# Patient Record
Sex: MALE | Race: OTHER | HISPANIC OR LATINO | URBAN - METROPOLITAN AREA
[De-identification: names, ages, dates, MRNs, and addresses within clinical notes are randomized per-mention and may not be internally consistent; named-entity substitution may affect disease eponyms.]

---
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
## 2019-08-14 ENCOUNTER — EMERGENCY (EMERGENCY)
Facility: HOSPITAL | Age: 19
LOS: 1 days | Discharge: HOME | End: 2019-08-14
Attending: EMERGENCY MEDICINE
Payer: MEDICAID

## 2019-08-14 VITALS
WEIGHT: 160.06 LBS | DIASTOLIC BLOOD PRESSURE: 77 MMHG | TEMPERATURE: 98 F | HEART RATE: 112 BPM | RESPIRATION RATE: 19 BRPM | OXYGEN SATURATION: 97 % | HEIGHT: 68 IN | SYSTOLIC BLOOD PRESSURE: 110 MMHG

## 2019-08-14 LAB
ALBUMIN SERPL ELPH-MCNC: 4.6 G/DL — SIGNIFICANT CHANGE UP (ref 3.5–5)
ALP SERPL-CCNC: 118 U/L — SIGNIFICANT CHANGE UP (ref 40–120)
ALT FLD-CCNC: 27 U/L DA — SIGNIFICANT CHANGE UP (ref 10–60)
ANION GAP SERPL CALC-SCNC: 6 MMOL/L — SIGNIFICANT CHANGE UP (ref 5–17)
APPEARANCE UR: CLEAR — SIGNIFICANT CHANGE UP
AST SERPL-CCNC: 22 U/L — SIGNIFICANT CHANGE UP (ref 10–40)
BASOPHILS # BLD AUTO: 0.04 K/UL — SIGNIFICANT CHANGE UP (ref 0–0.2)
BASOPHILS NFR BLD AUTO: 0.4 % — SIGNIFICANT CHANGE UP (ref 0–2)
BILIRUB SERPL-MCNC: 1.4 MG/DL — HIGH (ref 0.2–1.2)
BILIRUB UR-MCNC: ABNORMAL
BUN SERPL-MCNC: 9 MG/DL — SIGNIFICANT CHANGE UP (ref 7–18)
CALCIUM SERPL-MCNC: 9.5 MG/DL — SIGNIFICANT CHANGE UP (ref 8.4–10.5)
CHLORIDE SERPL-SCNC: 107 MMOL/L — SIGNIFICANT CHANGE UP (ref 96–108)
CO2 SERPL-SCNC: 27 MMOL/L — SIGNIFICANT CHANGE UP (ref 22–31)
COLOR SPEC: YELLOW — SIGNIFICANT CHANGE UP
CREAT SERPL-MCNC: 1.02 MG/DL — SIGNIFICANT CHANGE UP (ref 0.5–1.3)
DIFF PNL FLD: ABNORMAL
EOSINOPHIL # BLD AUTO: 0.01 K/UL — SIGNIFICANT CHANGE UP (ref 0–0.5)
EOSINOPHIL NFR BLD AUTO: 0.1 % — SIGNIFICANT CHANGE UP (ref 0–6)
GLUCOSE SERPL-MCNC: 100 MG/DL — HIGH (ref 70–99)
GLUCOSE UR QL: NEGATIVE — SIGNIFICANT CHANGE UP
HCT VFR BLD CALC: 48.7 % — SIGNIFICANT CHANGE UP (ref 39–50)
HGB BLD-MCNC: 16.5 G/DL — SIGNIFICANT CHANGE UP (ref 13–17)
IMM GRANULOCYTES NFR BLD AUTO: 0.2 % — SIGNIFICANT CHANGE UP (ref 0–1.5)
KETONES UR-MCNC: ABNORMAL
LEUKOCYTE ESTERASE UR-ACNC: NEGATIVE — SIGNIFICANT CHANGE UP
LIDOCAIN IGE QN: 72 U/L — LOW (ref 73–393)
LYMPHOCYTES # BLD AUTO: 1.25 K/UL — SIGNIFICANT CHANGE UP (ref 1–3.3)
LYMPHOCYTES # BLD AUTO: 12.2 % — LOW (ref 13–44)
MCHC RBC-ENTMCNC: 30.7 PG — SIGNIFICANT CHANGE UP (ref 27–34)
MCHC RBC-ENTMCNC: 33.9 GM/DL — SIGNIFICANT CHANGE UP (ref 32–36)
MCV RBC AUTO: 90.5 FL — SIGNIFICANT CHANGE UP (ref 80–100)
MONOCYTES # BLD AUTO: 0.84 K/UL — SIGNIFICANT CHANGE UP (ref 0–0.9)
MONOCYTES NFR BLD AUTO: 8.2 % — SIGNIFICANT CHANGE UP (ref 2–14)
NEUTROPHILS # BLD AUTO: 8.05 K/UL — HIGH (ref 1.8–7.4)
NEUTROPHILS NFR BLD AUTO: 78.9 % — HIGH (ref 43–77)
NITRITE UR-MCNC: NEGATIVE — SIGNIFICANT CHANGE UP
NRBC # BLD: 0 /100 WBCS — SIGNIFICANT CHANGE UP (ref 0–0)
PH UR: 6 — SIGNIFICANT CHANGE UP (ref 5–8)
PLATELET # BLD AUTO: 281 K/UL — SIGNIFICANT CHANGE UP (ref 150–400)
POTASSIUM SERPL-MCNC: 3.7 MMOL/L — SIGNIFICANT CHANGE UP (ref 3.5–5.3)
POTASSIUM SERPL-SCNC: 3.7 MMOL/L — SIGNIFICANT CHANGE UP (ref 3.5–5.3)
PROT SERPL-MCNC: 8.6 G/DL — HIGH (ref 6–8.3)
PROT UR-MCNC: 30 MG/DL
RBC # BLD: 5.38 M/UL — SIGNIFICANT CHANGE UP (ref 4.2–5.8)
RBC # FLD: 12.4 % — SIGNIFICANT CHANGE UP (ref 10.3–14.5)
SODIUM SERPL-SCNC: 140 MMOL/L — SIGNIFICANT CHANGE UP (ref 135–145)
SP GR SPEC: 1.02 — SIGNIFICANT CHANGE UP (ref 1.01–1.02)
UROBILINOGEN FLD QL: 4
WBC # BLD: 10.21 K/UL — SIGNIFICANT CHANGE UP (ref 3.8–10.5)
WBC # FLD AUTO: 10.21 K/UL — SIGNIFICANT CHANGE UP (ref 3.8–10.5)

## 2019-08-14 PROCEDURE — 96374 THER/PROPH/DIAG INJ IV PUSH: CPT | Mod: XU

## 2019-08-14 PROCEDURE — 81001 URINALYSIS AUTO W/SCOPE: CPT

## 2019-08-14 PROCEDURE — 85027 COMPLETE CBC AUTOMATED: CPT

## 2019-08-14 PROCEDURE — 74177 CT ABD & PELVIS W/CONTRAST: CPT | Mod: 26

## 2019-08-14 PROCEDURE — 93005 ELECTROCARDIOGRAM TRACING: CPT

## 2019-08-14 PROCEDURE — 99284 EMERGENCY DEPT VISIT MOD MDM: CPT

## 2019-08-14 PROCEDURE — 74177 CT ABD & PELVIS W/CONTRAST: CPT

## 2019-08-14 PROCEDURE — 36415 COLL VENOUS BLD VENIPUNCTURE: CPT

## 2019-08-14 PROCEDURE — 80053 COMPREHEN METABOLIC PANEL: CPT

## 2019-08-14 PROCEDURE — 83690 ASSAY OF LIPASE: CPT

## 2019-08-14 PROCEDURE — 99284 EMERGENCY DEPT VISIT MOD MDM: CPT | Mod: 25

## 2019-08-14 RX ORDER — ALBUTEROL 90 UG/1
0 AEROSOL, METERED ORAL
Qty: 0 | Refills: 0 | DISCHARGE

## 2019-08-14 RX ORDER — SODIUM CHLORIDE 9 MG/ML
1000 INJECTION INTRAMUSCULAR; INTRAVENOUS; SUBCUTANEOUS ONCE
Refills: 0 | Status: COMPLETED | OUTPATIENT
Start: 2019-08-14 | End: 2019-08-14

## 2019-08-14 RX ORDER — ONDANSETRON 8 MG/1
4 TABLET, FILM COATED ORAL ONCE
Refills: 0 | Status: COMPLETED | OUTPATIENT
Start: 2019-08-14 | End: 2019-08-14

## 2019-08-14 RX ADMIN — ONDANSETRON 4 MILLIGRAM(S): 8 TABLET, FILM COATED ORAL at 22:26

## 2019-08-14 RX ADMIN — SODIUM CHLORIDE 1000 MILLILITER(S): 9 INJECTION INTRAMUSCULAR; INTRAVENOUS; SUBCUTANEOUS at 22:26

## 2019-08-14 NOTE — ED PROVIDER NOTE - CARE PROVIDER_API CALL
Ulises Recio (DO)  Cardiology Medicine  [address] [place] [address], [address] [place] [address]  Phone: [phone]  Fax: [phone]  Follow Up Time:

## 2019-08-14 NOTE — ED PROVIDER NOTE - NSFOLLOWUPINSTRUCTIONS_ED_ALL_ED_FT
Return to ER immediately if your abdominal pain does not improve, worsens, or persists, if you have fever, vomiting,  blood in stools or you have black stools, unable to eat or drink, have worsening/persistent diarrhea or constipation, any concerns. See your doctor as soon as possible (within 1-2 days).   If you need further assistance for appointments you can contact the [place] Care Coordinator at [phone]. In addition our outpatient [facility] is located at [address] [place] [address], tele: [phone].

## 2019-08-14 NOTE — ED PROVIDER NOTE - OBJECTIVE STATEMENT
Chief complaint of nausea, vomiting, lower abdominal pain since 2pm. Denies fever. Pt also states felt weak, dnies LOC.  Denies recent outside [place] travels, sick contacts or known spoiled food consumption.

## 2019-08-14 NOTE — ED PROVIDER NOTE - CLINICAL SUMMARY MEDICAL DECISION MAKING FREE TEXT BOX
12:52a- Pt feels better, CT a/p reported normal appendix no infectious/inflammatory process. pt eating and drinking, no vomiting. Pt is well appearing walking with normal gait, stable for discharge and follow up with medical doctor. Pt educated on care and need for follow up. Discussed anticipatory guidance and return precautions. Questions answered. I had a detailed discussion with the patient and/or guardian regarding the historical points, exam findings, and any diagnostic results supporting the discharge diagnosis.  HR now 88 bpm.

## 2019-08-15 RX ORDER — ONDANSETRON 8 MG/1
1 TABLET, FILM COATED ORAL
Qty: 12 | Refills: 0
Start: 2019-08-15

## 2021-06-28 ENCOUNTER — TRANSCRIPTION ENCOUNTER (OUTPATIENT)
Age: 21
End: 2021-06-28

## 2021-12-02 ENCOUNTER — EMERGENCY (EMERGENCY)
Facility: HOSPITAL | Age: 21
LOS: 0 days | Discharge: HOME | End: 2021-12-03
Attending: EMERGENCY MEDICINE
Payer: MEDICAID

## 2021-12-02 VITALS
SYSTOLIC BLOOD PRESSURE: 114 MMHG | DIASTOLIC BLOOD PRESSURE: 76 MMHG | HEART RATE: 88 BPM | RESPIRATION RATE: 18 BRPM | OXYGEN SATURATION: 96 % | TEMPERATURE: 98 F

## 2021-12-02 VITALS — HEIGHT: 68 IN | WEIGHT: 210.1 LBS

## 2021-12-02 DIAGNOSIS — L98.9 DISORDER OF THE SKIN AND SUBCUTANEOUS TISSUE, UNSPECIFIED: ICD-10-CM

## 2021-12-02 DIAGNOSIS — L02.212 CUTANEOUS ABSCESS OF BACK [ANY PART, EXCEPT BUTTOCK]: ICD-10-CM

## 2021-12-02 DIAGNOSIS — Z91.013 ALLERGY TO SEAFOOD: ICD-10-CM

## 2021-12-02 PROCEDURE — 99283 EMERGENCY DEPT VISIT LOW MDM: CPT | Mod: 25

## 2021-12-02 PROCEDURE — 99282 EMERGENCY DEPT VISIT SF MDM: CPT | Mod: 25

## 2021-12-02 PROCEDURE — 10060 I&D ABSCESS SIMPLE/SINGLE: CPT

## 2021-12-02 PROCEDURE — 10061 I&D ABSCESS COMP/MULTIPLE: CPT

## 2021-12-02 RX ORDER — AZTREONAM 2 G
1 VIAL (EA) INJECTION
Qty: 14 | Refills: 0
Start: 2021-12-02 | End: 2021-12-08

## 2021-12-02 RX ORDER — CEPHALEXIN 500 MG
1 CAPSULE ORAL
Qty: 28 | Refills: 0
Start: 2021-12-02 | End: 2021-12-08

## 2021-12-02 NOTE — ED STATDOCS - PATIENT PORTAL LINK FT
You can access the FollowMyHealth Patient Portal offered by [facility] by registering at the following website: [email or web address]. By joining [employer]’s FollowMyHealth portal, you will also be able to view your health information using other applications (apps) compatible with our system.

## 2021-12-02 NOTE — ED ADULT TRIAGE NOTE - CHIEF COMPLAINT QUOTE
patient presents c/o possible growth vs. pimple ? on middle part of upper back.  patient reports growth has been there for a couple of years but has been getting progressively more painful, swollen and red over last week.  denies fever/chills.

## 2021-12-02 NOTE — ED STATDOCS - OBJECTIVE STATEMENT
22 yo M no significant PMHx presents with CC of back lesion.  Pt states has been there for years, however for a week has worsened.  C/o pimple, swelling, redness, and bloody discharge.  Denies fever or any other symptoms.  No other concerns.

## 2021-12-02 NOTE — ED STATDOCS - NSFOLLOWUPINSTRUCTIONS_ED_ALL_ED_FT
Keep area clean    Take antibiotics as prescribed    You have packing in place, if comes out this is okay, but ideally would like to stay in for 2 days.    Follow up in 2 days for wound reevaluation.     Return to ED for any concerns.

## 2021-12-03 PROBLEM — J45.909 UNSPECIFIED ASTHMA, UNCOMPLICATED: Chronic | Status: ACTIVE | Noted: 2019-08-14

## 2021-12-03 PROBLEM — K59.00 CONSTIPATION, UNSPECIFIED: Chronic | Status: ACTIVE | Noted: 2019-08-14

## 2021-12-15 ENCOUNTER — EMERGENCY (EMERGENCY)
Facility: HOSPITAL | Age: 21
LOS: 1 days | Discharge: HOME | End: 2021-12-15
Attending: STUDENT IN AN ORGANIZED HEALTH CARE EDUCATION/TRAINING PROGRAM
Payer: MEDICAID

## 2021-12-15 VITALS
WEIGHT: 178.57 LBS | DIASTOLIC BLOOD PRESSURE: 79 MMHG | SYSTOLIC BLOOD PRESSURE: 118 MMHG | HEART RATE: 89 BPM | TEMPERATURE: 98 F | RESPIRATION RATE: 18 BRPM | HEIGHT: 68 IN | OXYGEN SATURATION: 97 %

## 2021-12-15 PROCEDURE — G0463: CPT

## 2021-12-15 PROCEDURE — 99282 EMERGENCY DEPT VISIT SF MDM: CPT

## 2021-12-15 NOTE — ED PROVIDER NOTE - PHYSICAL EXAMINATION
GEN:   comfortable, in no apparent distress, AOx3  EYES:   PERRL, extra-occular movements intact  RESP:   clear to auscultation bilaterally, non-labored, speaking in full sentences  ABD:   soft, non tender, no guarding  :   no cva tenderness  MSK:   no musculoskeletal tenderness, 5/5 strength, moving all extremities  SKIN:   dry, no rash.  2cm incision with packing in place.  No surrounding warmth, redness, streaking, swelling.  NEURO:   AOx3, no focal weakness or loss of sensation, gait normal, GCS 15  PSYCH: calm, cooperative, no apparent risk to self and others

## 2021-12-15 NOTE — ED PROVIDER NOTE - PATIENT PORTAL LINK FT
You can access the FollowMyHealth Patient Portal offered by [facility] by registering at the following website: [email or web address]. By joining [email or web address] FollowMyHealth portal, you will also be able to view your health information using other applications (apps) compatible with our system.

## 2021-12-15 NOTE — ED PROVIDER NOTE - OBJECTIVE STATEMENT
21 year old male history of asthma presents for wound check.  Patient states that he went to [facility] for abscess on 12/2.      Patient states that he has had a cyst for years but it recently got worse    no fevers, chills.     States still has pain to area, relieved with tylenol. 21 year old male history of asthma presents for wound check.  Patient states that he went to [facility] for abscess on 12/2, had an I&D with packing but discharge instructions were unclear.  Patient states that he was placed on Bactrim, but still noted discharge from the wound and packing.  Patient went to PCP who left packing in place 6 days ago and started patient on Augmentin.  Patient / mother states that patient has a derm appointment tomorrow but would like area evaluated today.  Denies any fevers, chills.  Patient has been taking Tylenol for discomfort with relief.

## 2021-12-15 NOTE — ED PROVIDER NOTE - CLINICAL SUMMARY MEDICAL DECISION MAKING FREE TEXT BOX
21 year old male history of asthma presents for wound check.  Packing removed, no surrounding cellulitis, no purulent drainage.  Will have patient continue abx, patient to f/u with derm appointment tomorrow, return precautions provided.

## 2022-01-20 NOTE — ED PROVIDER NOTE - DIAGNOSIS COUNSELING, MDM
conducted a detailed discussion... I had a detailed discussion with the patient and/or guardian regarding the historical points, exam findings, and any diagnostic results supporting the discharge/admit diagnosis. no fever/no nausea/no pain/no vomiting/no weakness/no chills/no decreased eating/drinking

## 2022-02-14 ENCOUNTER — RESULT REVIEW (OUTPATIENT)
Age: 22
End: 2022-02-14

## 2022-03-03 ENCOUNTER — EMERGENCY (EMERGENCY)
Facility: HOSPITAL | Age: 22
LOS: 1 days | Discharge: HOME | End: 2022-03-03
Attending: EMERGENCY MEDICINE
Payer: MEDICAID

## 2022-03-03 VITALS
WEIGHT: 175.05 LBS | HEIGHT: 68 IN | SYSTOLIC BLOOD PRESSURE: 107 MMHG | OXYGEN SATURATION: 98 % | TEMPERATURE: 98 F | RESPIRATION RATE: 18 BRPM | DIASTOLIC BLOOD PRESSURE: 70 MMHG | HEART RATE: 98 BPM

## 2022-03-03 PROCEDURE — 73610 X-RAY EXAM OF ANKLE: CPT

## 2022-03-03 PROCEDURE — 99283 EMERGENCY DEPT VISIT LOW MDM: CPT

## 2022-03-03 PROCEDURE — 99283 EMERGENCY DEPT VISIT LOW MDM: CPT | Mod: 25

## 2022-03-03 PROCEDURE — 73610 X-RAY EXAM OF ANKLE: CPT | Mod: 26,LT

## 2022-03-03 RX ORDER — IBUPROFEN 200 MG
600 TABLET ORAL ONCE
Refills: 0 | Status: COMPLETED | OUTPATIENT
Start: 2022-03-03 | End: 2022-03-03

## 2022-03-03 NOTE — ED ADULT TRIAGE NOTE - CHIEF COMPLAINT QUOTE
left ankle pain s/p fall missed one step and  twisted left foot while going down the stairs denies head injury

## 2022-03-03 NOTE — ED PROVIDER NOTE - NSFOLLOWUPCLINICS_GEN_ALL_ED_FT
[facility]  Orthopedics  [address] [place] [address]  Phone: [phone]  Fax: [phone]

## 2022-03-03 NOTE — ED ADULT NURSE NOTE - SUICIDE SCREENING QUESTION 1
Patient called with questions about the lab work she needs to get done for Dr. Pickett. Please call patient to discuss.    No

## 2022-03-03 NOTE — ED PROVIDER NOTE - PATIENT PORTAL LINK FT
You can access the FollowMyHealth Patient Portal offered by [facility] by registering at the following website: [email or web address]. By joining [employer]’s FollowMyHealth portal, you will also be able to view your health information using other applications (apps) compatible with our system.

## 2022-03-03 NOTE — ED PROVIDER NOTE - OBJECTIVE STATEMENT
21 year old male presents to the ED with complaints of left ankle pain. Patient states he missed a step and twisted his ankle. Denies paresthesia and all other complaints.   NKDA.

## 2022-06-09 ENCOUNTER — NON-APPOINTMENT (OUTPATIENT)
Age: 22
End: 2022-06-09

## 2022-06-16 ENCOUNTER — NON-APPOINTMENT (OUTPATIENT)
Age: 22
End: 2022-06-16

## 2022-07-04 ENCOUNTER — NON-APPOINTMENT (OUTPATIENT)
Age: 22
End: 2022-07-04

## 2022-07-10 ENCOUNTER — NON-APPOINTMENT (OUTPATIENT)
Age: 22
End: 2022-07-10

## 2023-07-06 ENCOUNTER — EMERGENCY (EMERGENCY)
Facility: HOSPITAL | Age: 23
LOS: 1 days | Discharge: HOME | End: 2023-07-06
Attending: EMERGENCY MEDICINE
Payer: MEDICAID

## 2023-07-06 VITALS
RESPIRATION RATE: 17 BRPM | WEIGHT: 182.98 LBS | OXYGEN SATURATION: 92 % | TEMPERATURE: 98 F | DIASTOLIC BLOOD PRESSURE: 72 MMHG | HEART RATE: 88 BPM | SYSTOLIC BLOOD PRESSURE: 112 MMHG

## 2023-07-06 PROCEDURE — 99284 EMERGENCY DEPT VISIT MOD MDM: CPT

## 2023-07-06 PROCEDURE — 99283 EMERGENCY DEPT VISIT LOW MDM: CPT

## 2023-07-06 RX ORDER — DEXAMETHASONE 0.5 MG/5ML
10 ELIXIR ORAL ONCE
Refills: 0 | Status: COMPLETED | OUTPATIENT
Start: 2023-07-06 | End: 2023-07-06

## 2023-07-06 RX ADMIN — Medication 10 MILLIGRAM(S): at 20:28

## 2023-07-06 NOTE — ED PROVIDER NOTE - ATTENDING APP SHARED VISIT CONTRIBUTION OF CARE
seen with acp  here for generalized hives  patient given benadryl  will also start on dexamethasone  follow up with allergist  agree with acps assessment hx and physical and disposition

## 2023-07-06 NOTE — ED PROVIDER NOTE - NS ED ATTENDING STATEMENT MOD
This was a shared visit with the RIN. I reviewed and verified the documentation and independently performed the documented:

## 2023-07-06 NOTE — ED PROVIDER NOTE - CLINICAL SUMMARY MEDICAL DECISION MAKING FREE TEXT BOX
22 year old male with hives likely secondary to an allergic reaction of an unknown substance. Will give a 1x dose of dexamethasone and have patient continue benadryl at home. Will give derm follow up.

## 2023-07-06 NOTE — ED PROVIDER NOTE - OBJECTIVE STATEMENT
22-year-old male with a history of asthma presents with a generalized body rash that began on Tuesday.  Reports that it is itchy.  Took Benadryl 25 mg at 5 PM.  Denies new soaps, medications, shampoos, lotions.  Denies shortness of breath, tongue swelling.  Patient reports he was seen yesterday at [facility] and told that it was an allergic reaction/hives and to follow-up with an allergist.  Patient has an appointment for September.

## 2023-07-06 NOTE — ED PROVIDER NOTE - PATIENT PORTAL LINK FT
You can access the FollowMyHealth Patient Portal offered by [facility] by registering at the following website: [email or web address]. By joining [employer]’s FollowMyHealth portal, you will also be able to view your health information using other applications (apps) compatible with our system.

## 2023-07-06 NOTE — ED ADULT NURSE NOTE - DISCHARGE DATE/TIME
Patient reports being fully independent with functional mobility and Activities of Daily Living prior to admission without AD. Patient states not working X 2 years 2/2 overall fatigue and tendency to "faint". 06-Jul-2023 20:51

## 2023-07-06 NOTE — ED PROVIDER NOTE - NSFOLLOWUPINSTRUCTIONS_ED_ALL_ED_FT
Follow up with dermatology within 2 weeks.     Continue to take the Benadryl 50 mg every 8 hours for the next 3 days.     Then take a daily allergy medication such as zyrtec, claritin.     A 1x dose of steroids was given to you in the emergency room. This will work over the next 72 hours.     If you experience any new or worsening symptoms or if you are concerned you can always come back to the emergency for a re-evaluation.

## 2023-07-06 NOTE — ED PROVIDER NOTE - CROS ED ROS STATEMENT
[facility] [place] EMERGENCY [facility] AT [place] Case Management Initial Discharge Assessment    Met with Patient to discuss discharge plan. PCP: Evaristo Baez,                                 Date of Last Visit: APPROX 2 MONTHS AGO PER PT    If no PCP, list provided? N/A    Discharge Planning    Living Arrangements: independently at home    Who do you live with? SPOUSE    Who helps you with your care:  spouse    If lives at home:     Do you have any barriers navigating in your home? no    Patient can perform ADL? Yes, WIFE HELPS HIM WITH SHOWERING    Current Services (outpatient and in home) :  None    Dialysis: No    Is transportation available to get to your appointments? Yes    DME Equipment:  yes - [facility]    Respiratory equipment: None    Respiratory provider:  no     Pharmacy:  yes - MAIL ORDER FOR SOME, DRUG MART  [facility] with Medication Assistance Program?  No      Patient agreeable to [facility]? No    Patient agreeable to SNF/Rehab? No    Other discharge needs identified? N/A    Freedom of choice list provided with basic dialogue that supports the patient's individualized plan of care/goals and shares the quality data associated with the providers. Yes    Does Patient Have a High-Risk for Readmission Diagnosis (CHF, PN, MI, COPD)? Yes, see care coordinator assessment    If Yes,     Transition of Care is related to the following treatment goals:DIZZINESS TO IMPROVE    Initial Discharge Plan? (Note: please see concurrent daily documentation for any updates after initial note). FROM HOME WITH WIFE.  WANTS A WALKER AT DC. PLANS TO RETURN HOME. DOES NOT ANTICIPATE ANY DC NEEDS.       The Patient and/or patient representative: PT was provided with choice of any post-acute providers for care and equipment and agrees with discharge plan  Yes    Electronically signed by CAROLINE Spaulding on 3/14/2020 at 9:58 AM all other ROS negative except as per HPI

## 2023-07-06 NOTE — ED ADULT NURSE NOTE - OBJECTIVE STATEMENT
pt  is a 21 y/o  male  with  c/o  rash  all  over  the  body  since Tuesday  denies any  nausea and vomiting pt  no  shortness  of  breath  with  airway  patent.

## 2023-07-06 NOTE — ED PROVIDER NOTE - PHYSICAL EXAMINATION
Hives noted diffusely throughout the body. Mostly concentration to upper chest, back and lower extremities.

## 2023-07-06 NOTE — ED ADULT NURSE NOTE - NSFALLUNIVINTERV_ED_ALL_ED
Bed/Stretcher in lowest position, wheels locked, appropriate side rails in place/Call bell, personal items and telephone in reach/Instruct patient to call for assistance before getting out of bed/chair/stretcher/Non-slip footwear applied when patient is off stretcher/[place] to call system/Physically safe environment - no spills, clutter or unnecessary equipment/Purposeful proactive rounding/Room/bathroom lighting operational, light cord in reach

## 2023-07-29 ENCOUNTER — NON-APPOINTMENT (OUTPATIENT)
Age: 23
End: 2023-07-29

## 2025-01-06 NOTE — ED ADULT NURSE NOTE - NSIMPLEMENTINTERV_GEN_ALL_ED
negative Implemented All Fall Risk Interventions:  [place] to call system. Call bell, personal items and telephone within reach. Instruct patient to call for assistance. Room bathroom lighting operational. Non-slip footwear when patient is off stretcher. Physically safe environment: no spills, clutter or unnecessary equipment. Stretcher in lowest position, wheels locked, appropriate side rails in place. Provide visual cue, wrist band, yellow gown, etc. Monitor gait and stability. Monitor for mental status changes and reorient to person, place, and time. Review medications for side effects contributing to fall risk. Reinforce activity limits and safety measures with patient and family.

## 2025-03-25 ENCOUNTER — NON-APPOINTMENT (OUTPATIENT)
Age: 25
End: 2025-03-25

## 2025-05-29 ENCOUNTER — APPOINTMENT (OUTPATIENT)
Dept: UROLOGY | Facility: CLINIC | Age: 25
End: 2025-05-29
Payer: MEDICAID

## 2025-05-29 VITALS
HEART RATE: 78 BPM | SYSTOLIC BLOOD PRESSURE: 104 MMHG | OXYGEN SATURATION: 98 % | TEMPERATURE: 98 F | DIASTOLIC BLOOD PRESSURE: 69 MMHG

## 2025-05-29 VITALS — BODY MASS INDEX: 25.31 KG/M2 | HEIGHT: 68 IN | WEIGHT: 167 LBS

## 2025-05-29 DIAGNOSIS — Z78.9 OTHER SPECIFIED HEALTH STATUS: ICD-10-CM

## 2025-05-29 DIAGNOSIS — R30.0 DYSURIA: ICD-10-CM

## 2025-05-29 DIAGNOSIS — Z87.09 PERSONAL HISTORY OF OTHER DISEASES OF THE RESPIRATORY SYSTEM: ICD-10-CM

## 2025-05-29 PROCEDURE — 99204 OFFICE O/P NEW MOD 45 MIN: CPT

## 2025-05-29 PROCEDURE — G2211 COMPLEX E/M VISIT ADD ON: CPT | Mod: NC

## 2025-05-29 RX ORDER — PHENAZOPYRIDINE 200 MG/1
200 TABLET, FILM COATED ORAL 3 TIMES DAILY
Qty: 10 | Refills: 0 | Status: ACTIVE | COMMUNITY
Start: 2025-05-29 | End: 1900-01-01

## 2025-05-29 RX ORDER — LORATADINE 10 MG/1
TABLET ORAL
Refills: 0 | Status: ACTIVE | COMMUNITY

## 2025-05-30 LAB
APPEARANCE: ABNORMAL
BACTERIA: NEGATIVE /HPF
BILIRUBIN URINE: NEGATIVE
BLOOD URINE: NEGATIVE
CAST: 0 /LPF
COLOR: YELLOW
EPITHELIAL CELLS: 0 /HPF
GLUCOSE QUALITATIVE U: NEGATIVE MG/DL
KETONES URINE: NEGATIVE MG/DL
LEUKOCYTE ESTERASE URINE: NEGATIVE
MICROSCOPIC-UA: NORMAL
NITRITE URINE: NEGATIVE
PH URINE: 7
PROTEIN URINE: NEGATIVE MG/DL
RED BLOOD CELLS URINE: 1 /HPF
SPECIFIC GRAVITY URINE: 1.02
UROBILINOGEN URINE: 0.2 MG/DL
WHITE BLOOD CELLS URINE: 0 /HPF

## 2025-06-04 ENCOUNTER — APPOINTMENT (OUTPATIENT)
Dept: ULTRASOUND IMAGING | Facility: CLINIC | Age: 25
End: 2025-06-04

## 2025-06-04 ENCOUNTER — APPOINTMENT (OUTPATIENT)
Dept: UROLOGY | Facility: CLINIC | Age: 25
End: 2025-06-04

## 2025-06-04 DIAGNOSIS — N39.0 URINARY TRACT INFECTION, SITE NOT SPECIFIED: ICD-10-CM

## 2025-06-04 LAB — BACTERIA UR CULT: ABNORMAL

## 2025-06-04 RX ORDER — CIPROFLOXACIN HYDROCHLORIDE 500 MG/1
500 TABLET, FILM COATED ORAL TWICE DAILY
Qty: 14 | Refills: 0 | Status: ACTIVE | COMMUNITY
Start: 2025-06-04 | End: 1900-01-01